# Patient Record
Sex: MALE | Race: WHITE | Employment: OTHER | URBAN - METROPOLITAN AREA
[De-identification: names, ages, dates, MRNs, and addresses within clinical notes are randomized per-mention and may not be internally consistent; named-entity substitution may affect disease eponyms.]

---

## 2020-06-19 NOTE — PROGRESS NOTES
[facility]  [phone]        Pre-Op Phone Call:     Patient Name: Rhoda Katz     No relevant phone numbers on file. Home phone:  [phone]    Surgery Time:   29-75-24-36 Time:  6927     Left extended Message:  NA     Message left with:     Recent change in health status:  NA     Advised of transportation/ policy:  Follow MD instructions      NPO policy reviewed:  NA     Advised to take morning heart/blood pressure medications with sips of water morning of surgery? Yes     Instructed to bring eye drops, photo identification, and insurance card day of surgery? Yes     Advised to wear short sleeved button down shirt (no T-shirt underneath):  NA     Advised not to wear jewelry, hairpins, or pantyhose day of surgery? NA     Advised not to wear make-up and to wash face day of surgery?   NA    Remarks:        Electronically signed by:  Faustina Chakraborty RN at 6/19/2020 3:55 PM

## 2020-06-22 ENCOUNTER — HOSPITAL ENCOUNTER (OUTPATIENT)
Dept: SURGERY | Age: 85
Setting detail: OUTPATIENT SURGERY
Discharge: HOME | End: 2020-06-22
Payer: MEDICARE

## 2020-06-22 VITALS — SYSTOLIC BLOOD PRESSURE: 150 MMHG | DIASTOLIC BLOOD PRESSURE: 79 MMHG

## 2020-06-22 PROCEDURE — 66821 AFTER CATARACT LASER SURGERY: CPT

## 2020-06-22 PROCEDURE — 6370000000 HC RX 637 (ALT 250 FOR IP): Performed by: OPHTHALMOLOGY

## 2020-06-22 RX ORDER — FUROSEMIDE 20 MG/1
TABLET ORAL
COMMUNITY
Start: 2020-06-01

## 2020-06-22 RX ORDER — LOVASTATIN 40 MG/1
TABLET ORAL
COMMUNITY
Start: 2020-04-30

## 2020-06-22 RX ORDER — AMLODIPINE BESYLATE 5 MG/1
TABLET ORAL
COMMUNITY
Start: 2020-03-31

## 2020-06-22 RX ORDER — PHENYLEPHRINE HCL 2.5 %
1 DROPS OPHTHALMIC (EYE) ONCE
Status: COMPLETED | OUTPATIENT
Start: 2020-06-22 | End: 2020-06-22

## 2020-06-22 RX ORDER — SODIUM CHLORIDE 0.9 % (FLUSH) 0.9 %
10 SYRINGE (ML) INJECTION EVERY 12 HOURS SCHEDULED
Status: DISCONTINUED | OUTPATIENT
Start: 2020-06-22 | End: 2020-06-23 | Stop reason: HOSPADM

## 2020-06-22 RX ORDER — LEVOTHYROXINE SODIUM 0.05 MG/1
TABLET ORAL
COMMUNITY
Start: 2020-04-30

## 2020-06-22 RX ORDER — TROPICAMIDE 10 MG/ML
1 SOLUTION/ DROPS OPHTHALMIC ONCE
Status: COMPLETED | OUTPATIENT
Start: 2020-06-22 | End: 2020-06-22

## 2020-06-22 RX ORDER — LISINOPRIL 40 MG/1
TABLET ORAL
COMMUNITY
Start: 2020-04-04

## 2020-06-22 RX ORDER — SODIUM CHLORIDE 0.9 % (FLUSH) 0.9 %
10 SYRINGE (ML) INJECTION PRN
Status: DISCONTINUED | OUTPATIENT
Start: 2020-06-22 | End: 2020-06-23 | Stop reason: HOSPADM

## 2020-06-22 RX ADMIN — TROPICAMIDE 1 DROP: 10 SOLUTION/ DROPS OPHTHALMIC at 11:05

## 2020-06-22 RX ADMIN — PHENYLEPHRINE HYDROCHLORIDE 1 DROP: 25 SOLUTION/ DROPS OPHTHALMIC at 11:05

## 2020-06-22 NOTE — OP NOTE
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 Post-operative diagnosis:  Same    Procedure Performed:  YAG Capsulotomy of the left eye    Anesthesia:  None    Estimated Blood Loss: None    Specimens removed: None    Complications:  None    Description of Procedure: A Yag Capsulotomy was performed today on the left eye. Pt appears to be oriented to time, place, and person. Pre-op medications instilled in the left eye: Proparacaine 1 drop, Sam-Synephrine 2.5% 1 drop topical and Mydriacyl 0.5% 1 drop topical. Patient is comfortable and will be released to self.      Electronically signed by: Gonzalo Lund MD,6/22/2020,11:54 AM

## 2022-10-02 PROCEDURE — 99283 EMERGENCY DEPT VISIT LOW MDM: CPT

## 2022-10-03 ENCOUNTER — HOSPITAL ENCOUNTER (EMERGENCY)
Age: 87
Discharge: HOME | End: 2022-10-03
Attending: EMERGENCY MEDICINE
Payer: MEDICARE

## 2022-10-03 VITALS
WEIGHT: 213.19 LBS | OXYGEN SATURATION: 97 % | RESPIRATION RATE: 18 BRPM | SYSTOLIC BLOOD PRESSURE: 168 MMHG | DIASTOLIC BLOOD PRESSURE: 93 MMHG | HEART RATE: 90 BPM | TEMPERATURE: 98.1 F

## 2022-10-03 DIAGNOSIS — H60.501 ACUTE OTITIS EXTERNA OF RIGHT EAR, UNSPECIFIED TYPE: Primary | ICD-10-CM

## 2022-10-03 DIAGNOSIS — R21 RASH AND OTHER NONSPECIFIC SKIN ERUPTION: ICD-10-CM

## 2022-10-03 LAB
GLUCOSE BLD-MCNC: 127 MG/DL (ref 70–99)
PERFORMED ON: ABNORMAL

## 2022-10-03 RX ORDER — TRAMADOL HYDROCHLORIDE 50 MG/1
50 TABLET ORAL EVERY 6 HOURS PRN
Qty: 3 TABLET | Refills: 0 | Status: SHIPPED | OUTPATIENT
Start: 2022-10-03 | End: 2022-10-04

## 2022-10-03 RX ORDER — AZITHROMYCIN 250 MG/1
TABLET, FILM COATED ORAL
Qty: 6 TABLET | Refills: 0 | Status: SHIPPED | OUTPATIENT
Start: 2022-10-03 | End: 2022-10-13

## 2022-10-03 RX ORDER — NEOMYCIN SULFATE, POLYMYXIN B SULFATE AND HYDROCORTISONE 10; 3.5; 1 MG/ML; MG/ML; [USP'U]/ML
3 SUSPENSION/ DROPS AURICULAR (OTIC) 4 TIMES DAILY
Qty: 10 ML | Refills: 0 | Status: SHIPPED | OUTPATIENT
Start: 2022-10-03 | End: 2022-10-13

## 2022-10-03 ASSESSMENT — PAIN SCALES - GENERAL: PAINLEVEL_OUTOF10: 8

## 2022-10-03 ASSESSMENT — PAIN - FUNCTIONAL ASSESSMENT: PAIN_FUNCTIONAL_ASSESSMENT: 0-10

## 2022-10-03 NOTE — ED NOTES
D/C: Order noted for d/c. Pt confirmed d/c paperwork  have correct name. Discharge and education instructions reviewed with patient. Teach-back successful. Pt verbalized understanding and signed d/c papers. Pt denied questions at this time. No acute distress noted. Patient instructed to follow-up as noted - return to emergency department if symptoms worsen. Patient verbalized understanding. Discharged per EDMD with discharge instructions. Pt discharged to private vehicle. Patient stable upon departure. Thanked patient for choosing [facility]) for care.           Lynette Cook RN  10/03/22 4263

## 2022-10-03 NOTE — DISCHARGE INSTRUCTIONS
Take prescribed medication as prescribed only  Follow-up with ENT Dr. Carlin Sicard. Give his office a call in the morning to be seen. Follow-up to primary care physician as needed  Return emergency room for any worsening. Do not take anymore the amoxicillin. Start taking the Zithromax.

## 2022-10-03 NOTE — ED PROVIDER NOTES
Date of evaluation: 10/2/2022    ED Attending Attestation Note     CHIEF COMPLAINT     Pain   HISTORY OF PRESENT ILLNESS  (Location/Symptom, Timing/Onset,Context/Setting, Quality, Duration, Modifying Factors, Severity). Note limiting factors. This patient was seen by the advance practice provider. I have seen and examined the patient, agree with the workup, evaluation, management and diagnosis. The care plan has been discussed. Chief Complaint   Patient presents with    Rash     Diagnosed last Wednesday for inner ear infection. Pt noticed rash tonight. Paul Wise is a 80 y.o. male who presents to the emergency department secondary to concern for worsening ear pain on the right. He also has a rash noted to the face however he states this is secondary. The rash is not painful, itchy, he did not know about it until his daughter visited him in the evening time. He states his main concern is that the pain is not letting him sleep. He was taking the hydrocodone prescribed to him by [facility] without any improvement but he did finish the medication 2 days ago. He also tells me he is worried diabetes runs in his family and maybe he is having some diabetes issues now. Denies any increased thirst, does endorse some increased urination. No dysuria. No Past medical history noted below: He does report a history of alcohol abuse for 25 years to me.   has no past medical history on file. Social History     Socioeconomic History    Marital status:      Spouse name: None    Number of children: None    Years of education: None    Highest education level: None   Tobacco Use    Smoking status: Never    Smokeless tobacco: Never   Vaping Use    Vaping Use: Never used   Substance and Sexual Activity    Alcohol use: Not Currently    Drug use: Never    Sexual activity: Not Currently     Aside from what is stated above denies any other symptoms or modifying factors.     Nursing Notes reviewed. History reviewed. No pertinent surgical history. History reviewed. No pertinent family history. CURRENT MEDICATIONS       Previous Medications    AMLODIPINE (NORVASC) 5 MG TABLET        FUROSEMIDE (LASIX) 20 MG TABLET        LEVOTHYROXINE (SYNTHROID) 50 MCG TABLET        LISINOPRIL (PRINIVIL;ZESTRIL) 40 MG TABLET        LOVASTATIN (MEVACOR) 40 MG TABLET        METFORMIN HCL PO    Take 500 mcg by mouth once    VITAMIN D 25 MCG (1000 UT) CAPS    Take 1 capsule by mouth daily      DIAGNOSTIC RESULTS   RADIOLOGY:   Interpretation per Radiologist below, if available at the time of this note:  No orders to display     Patient was given the following medications:  Orders Placed This Encounter   Medications    neomycin-polymyxin-hydrocortisone (CORTISPORIN) 3.5-63674-0 otic suspension     Sig: Place 3 drops into the right ear 4 times daily for 10 days     Dispense:  10 mL     Refill:  0    traMADol (ULTRAM) 50 MG tablet     Sig: Take 1 tablet by mouth every 6 hours as needed for Pain (MAY TAKE 2 TABS EVERY 6 HOURS FOR SEVERE PAIN) for up to 1 day. Dispense:  3 tablet     Refill:  0    azithromycin (ZITHROMAX) 250 MG tablet     Si TABS DAY 1 THEN 1 TAB DAYS 2-5     Dispense:  6 tablet     Refill:  0       INITIAL VITALS: BP: (!) 168/93, Temp: 98.1 °F (36.7 °C), Heart Rate: 90, Resp: 18, SpO2: 97 %     I personally saw the patient and performed a substantive portion of the visit including all aspects of the medical decision making. Is this patient to be included in the SEP-1 Core Measure due to severe sepsis or septic shock? No   Exclusion criteria - the patient is NOT to be included for SEP-1 Core Measure due to:  2+ SIRS criteria are not met      My assessment reveals an elderly male who is sitting up in bed. Does not appear to be in any acute medical arrest or distress. Answers questions appropriately and in complete sentences.   He has a rash as seen in the imaging does not appear consistent with a rash from amoxicillin which is something he is concerned about. It does not appear consistent with shingles. He does appear to have a component of otitis externa in addition to the otitis media. As the amoxicillin has not been working for him he was changed to azithromycin and also started on eardrops. He did have a fingerstick done here that was within normal limits. I had a really long discussion with him and his daughter regarding stronger pain medications given the patient lives alone and is 80years old. While he feels very comfortable taking medications due to his prior alcohol history I told him this makes me more nervous about prescribing him anything stronger. We did give him a referral to ENT and he was prescribed 3 tablets of tramadol to go home with. We discussed return precautions and reiterated importance of following up with ENT. He and his daughter both expressed understanding of instructions, were in agreement with plan, and he was discharged home in stable condition. FINAL IMPRESSION      1. Acute otitis externa of right ear, unspecified type    2. Rash and other nonspecific skin eruption        DISPOSITION/PLAN   DISPOSITION Decision To Discharge 10/03/2022 02:50:56 AM      PATIENT REFERRED TO:  Zachariah Osei MD  [address] [facility]  [address] [facility]  [phone]    Call   As needed    [address], [place] [address]  [phone]    Call in 1 day        DISCHARGE MEDICATIONS:  New Prescriptions    AZITHROMYCIN (ZITHROMAX) 250 MG TABLET    2 TABS DAY 1 THEN 1 TAB DAYS 2-5    NEOMYCIN-POLYMYXIN-HYDROCORTISONE (CORTISPORIN) 3.5-07001-0 OTIC SUSPENSION    Place 3 drops into the right ear 4 times daily for 10 days    TRAMADOL (ULTRAM) 50 MG TABLET    Take 1 tablet by mouth every 6 hours as needed for Pain (MAY TAKE 2 TABS EVERY 6 HOURS FOR SEVERE PAIN) for up to 1 day.             (Please note that portions of this note were completed with a voice recognition program. Efforts were made to edit the dictations but occasionally words are mis-transcribed.)    Josleyn Craft MD (electronically signed)  Attending Emergency Physician        Joselyn Craft MD  10/03/22 6027

## 2022-10-03 NOTE — ED TRIAGE NOTES
Pt presents to ED with c/o rash that developed today on the right side of his face including his ear. R ear noted to be red and swollen. Pt states he was diagnosed last Wednesday with an inner ear infection in which he was prescribed hydrocodone and amoxicillin. Pt denies ringing of the ears. Pt rates ear pain a 8/10.

## 2022-10-03 NOTE — ED PROVIDER NOTES
**ADVANCED PRACTICE PROVIDER, I HAVE EVALUATED THIS PATIENT**        [address]      Pt Name: Kasandra Stringer  CEJ:[id number]  [place] 1934  Date of evaluation: 10/2/2022  Provider: Dalia Martins PA-C    This patient was seen and evaluated by attending physician Dr. Shahrzad Brown MD      Chief Complaint:    Chief Complaint   Patient presents with    Rash     Diagnosed last Wednesday for inner ear infection. Pt noticed rash tonight. Nursing Notes, Past Medical Hx, Past Surgical Hx, Social Hx, Allergies, and Family Hx were all reviewed and agreed with or any disagreements were addressed in the HPI.    HPI: (Location, Duration, Timing, Severity, Quality, Assoc Sx, Context, Modifying factors)    Chief Complaint of complaint of right ear pain. Also complaint of rash on his face that started appearing 2 days after taking antibiotic he was given for ear infection. He was given amoxicillin that he take twice a day. And he is on his third day of antibiotic. Denies fever, still complain of pain to the right ear. [place] Reeks is gotten worse without swelling. Denies any drainage. No difficulty with hearing. No other complaint. This is a  80 y.o. male who presents to emergency room with the above complaint. PastMedical/Surgical History:  History reviewed. No pertinent past medical history. History reviewed. No pertinent surgical history.     Medications:  Discharge Medication List as of 10/3/2022  3:04 AM        CONTINUE these medications which have NOT CHANGED    Details   amLODIPine (NORVASC) 5 MG tablet Historical Med      lisinopril (PRINIVIL;ZESTRIL) 40 MG tablet Historical Med      levothyroxine (SYNTHROID) 50 MCG tablet Historical Med      lovastatin (MEVACOR) 40 MG tablet Historical Med      METFORMIN HCL PO Take 500 mcg by mouth onceHistorical Med      furosemide (LASIX) 20 MG tablet Historical Med      vitamin D 25 MCG (1000 UT) CAPS Take 1 capsule by mouth dailyHistorical Med               Review of Systems:  (2-9 systems needed)  Review of Systems   Constitutional:  Negative for chills and fever. HENT:  Positive for ear pain. Negative for congestion, ear discharge and sore throat. Eyes:  Negative for pain and visual disturbance. Respiratory:  Negative for cough and shortness of breath. Cardiovascular:  Negative for chest pain and leg swelling. Gastrointestinal:  Negative for abdominal pain, nausea and vomiting. Genitourinary:  Negative for dysuria and frequency. Musculoskeletal:  Negative for back pain and neck pain. Skin:  Positive for rash. Negative for wound. Neurological:  Negative for dizziness and light-headedness. \"Positives and Pertinent negatives as per HPI\"    Physical Exam:  Physical Exam  Vitals and nursing note reviewed. Constitutional:       Appearance: He is well-developed. He is not diaphoretic. HENT:      Head: Normocephalic and atraumatic. Right Ear: Hearing normal. No decreased hearing noted. Swelling and tenderness present. No drainage. There is no impacted cerumen. No foreign body. No mastoid tenderness. Left Ear: Hearing, tympanic membrane, ear canal and external ear normal.      Nose: Nose normal.   Eyes:      General:         Right eye: No discharge. Left eye: No discharge. Cardiovascular:      Rate and Rhythm: Normal rate and regular rhythm. Heart sounds: Normal heart sounds. No murmur heard. No friction rub. No gallop. Pulmonary:      Effort: Pulmonary effort is normal. No respiratory distress. Breath sounds: Normal breath sounds. No wheezing or rales. Chest:      Chest wall: No tenderness. Musculoskeletal:         General: Normal range of motion. Cervical back: Normal range of motion and neck supple. Skin:     General: Skin is warm and dry. Findings: Erythema and rash present. Rash is macular.    Neurological:      Mental Status: He is alert and oriented to person, place, and time. Psychiatric:         Behavior: Behavior normal.       MEDICAL DECISION MAKING    Vitals:    Vitals:    10/03/22 0050   BP: (!) 168/93   Pulse: 90   Resp: 18   Temp: 98.1 °F (36.7 °C)   TempSrc: Oral   SpO2: 97%   Weight: 213 lb 3 oz (96.7 kg)       LABS:  Labs Reviewed   POCT GLUCOSE - Abnormal; Notable for the following components:       Result Value    POC Glucose 127 (*)     All other components within normal limits   POCT GLUCOSE        Remainder of labs reviewed and were negative at this time or not returned at the time of this note. RADIOLOGY:   Non-plain film images such as CT, Ultrasound and MRI are read by the radiologist. Jaci Okeefe PA-C have directly visualized the radiologic plain film image(s) with the below findings:      Interpretation per the Radiologist below, if available at the time of this note:    No orders to display        No results found. MEDICAL DECISION MAKING / ED COURSE:      PROCEDURES:   Procedures    None    Patient was given:  Medications - No data to display    Emergency room course: Patient on exam throat is clear. Nonerythematous no exudate noted. Uvula midline without swelling. Left ear is clear. TM is visible nonbulging nonerythematous. No pinna mastoid or tragus tenderness. .  The right ear showed no mastoid tenderness. Does show tragus and pinna tenderness slight swelling noted of the pinna. No erythema. Sensitive to the speculum there is noticeable swelling in the ear canal.  Unable to visualize the TM due to the swelling and pain that it causes to insert the speculum. No drainage. Cardiovascular regular rhythm, lungs are clear. No wheeze rales or rhonchi noted. Patient does have a macular erythematous rash noted on his face more on the right side and on his cheek. Unsure what is caused this does not look like allergy to penicillin.     Did have my attending Dr. Trudi Moore see this patient with me. She agrees that it does not look like a penicillin allergy. Unsure what caused the rash. See photograph below. At this time I did discuss with patient treatment plan for his ear. I will put him on Cortisporin otic. I did inform him since he only have a few days of antibiotic left continue that. I will give him Dr. Vazquez Munoz ENT to follow-up with. His daughter and the patient was okay with this. I will put him on a few tramadol for pain. The patient tolerated their visit well. I evaluated the patient. The physician was available for consultation as needed. The patient and / or the family were informed of the results of any tests, a time was given to answer questions, a plan was proposed and they agreed with plan. I am the Primary Clinician of Record. CLINICAL IMPRESSION:  1. Acute otitis externa of right ear, unspecified type    2.  Rash and other nonspecific skin eruption        DISPOSITION Decision To Discharge 10/03/2022 02:50:56 AM      PATIENT REFERRED TO:  Yaw Marlow MD  [address] [place] [address]  [phone]    Call   As needed    [address]  [address] [facility]  [phone]    Call in 1 day      DISCHARGE MEDICATIONS:  Discharge Medication List as of 10/3/2022  3:04 AM        START taking these medications    Details   neomycin-polymyxin-hydrocortisone (CORTISPORIN) 3.5-25515-4 otic suspension Place 3 drops into the right ear 4 times daily for 10 days, Disp-10 mL, R-0Print      traMADol (ULTRAM) 50 MG tablet Take 1 tablet by mouth every 6 hours as needed for Pain (MAY TAKE 2 TABS EVERY 6 HOURS FOR SEVERE PAIN) for up to 1 day., Disp-3 tablet, R-0Print      azithromycin (ZITHROMAX) 250 MG tablet 2 TABS DAY 1 THEN 1 TAB DAYS 2-5, Disp-6 tablet, R-0Print             DISCONTINUED MEDICATIONS:  Discharge Medication List as of 10/3/2022  3:04 AM                 (Please note the MDM and HPI sections of this note were completed with a voice recognition program.  Efforts were made to edit the dictations but occasionally words are mis-transcribed.)    Electronically signed, Marisa Roman PA-C,          Marisa Roman PA-C  10/06/22 8712

## 2022-10-05 ENCOUNTER — TELEPHONE (OUTPATIENT)
Dept: ENT CLINIC | Age: 87
End: 2022-10-05

## 2022-10-05 ENCOUNTER — OFFICE VISIT (OUTPATIENT)
Dept: ENT CLINIC | Age: 87
End: 2022-10-05
Payer: MEDICARE

## 2022-10-05 VITALS
HEART RATE: 96 BPM | HEIGHT: 74 IN | DIASTOLIC BLOOD PRESSURE: 99 MMHG | SYSTOLIC BLOOD PRESSURE: 196 MMHG | WEIGHT: 210 LBS | BODY MASS INDEX: 26.95 KG/M2

## 2022-10-05 DIAGNOSIS — H92.01 RIGHT EAR PAIN: Primary | ICD-10-CM

## 2022-10-05 DIAGNOSIS — B02.9 HERPES ZOSTER WITHOUT COMPLICATION: ICD-10-CM

## 2022-10-05 PROCEDURE — 99204 OFFICE O/P NEW MOD 45 MIN: CPT | Performed by: OTOLARYNGOLOGY

## 2022-10-05 PROCEDURE — 1036F TOBACCO NON-USER: CPT | Performed by: OTOLARYNGOLOGY

## 2022-10-05 PROCEDURE — G8484 FLU IMMUNIZE NO ADMIN: HCPCS | Performed by: OTOLARYNGOLOGY

## 2022-10-05 PROCEDURE — G8427 DOCREV CUR MEDS BY ELIG CLIN: HCPCS | Performed by: OTOLARYNGOLOGY

## 2022-10-05 PROCEDURE — 1123F ACP DISCUSS/DSCN MKR DOCD: CPT | Performed by: OTOLARYNGOLOGY

## 2022-10-05 PROCEDURE — G8419 CALC BMI OUT NRM PARAM NOF/U: HCPCS | Performed by: OTOLARYNGOLOGY

## 2022-10-05 NOTE — PROGRESS NOTES
[facility]      Patient Name: Elyssa Guerra  Medical Record Number:  [id number]  Primary Care Physician:  Heather Hays MD  Date of Consultation: 10/5/2022    Chief Complaint: Right otalgia      HISTORY OF PRESENT ILLNESS  Hamida Chung is a(n) 80 y.o. male who presents for evaluation of right-sided otalgia. The patient says that about a week and half ago he started having severe right-sided ear pain. He went to the emergency room outside facility and it sounds as though he had fairly normal exam findings. He said he had an in onset of rash and swelling of the right ear as well as the face. He was then diagnosed with a rash and otitis externa. He was started on eardrops and azithromycin. He said it is gotten a little bit better, but he still has pain. He said that he did have a bit of muffled hearing that has resolved. No changes in vision. REVIEW OF SYSTEMS  As above    PHYSICAL EXAM  GENERAL: No Acute Distress, Alert and Oriented, no Hoarseness, strong voice  EYES: EOMI, Anti-icteric  HENT:   Head: Normocephalic and atraumatic. Face: Appear to have some healing vesicular eruptions of the V3 distribution  Right Ear: Patient has vesicular eruptions of the loly. The ear canal itself looks okay. Left Ear: Normal external ear, normal external auditory canal, intact tympanic membrane with normal mobility and aerated middle ear  Mouth/Oral Cavity:  normal lips, Uvula is midline, no mucosal lesions, no trismus  Oropharynx/Larynx:  normal oropharynx  Nose:Normal external nasal appearance. NECK: Normal range of motion, no thyromegaly, trachea is midline, no lymphadenopathy, no neck masses, no crepitus            ASSESSMENT/PLAN  1. Right ear pain  Patient's exam and history is consistent with a herpes zoster outbreak in the V3 distribution. It is getting better at this point.   I think its been too long to benefit from oral antivirals. I see no evidence this is affecting his hearing or vision. I explained to him that sometimes the pain itself will take a while to completely resolve. If it persists we may have to have him see his primary care doctor to consider something like Neurontin. 2. Herpes zoster without complication  He still has some vesicular eruption of the loly. I will give him Bactroban ointment just to prevent a secondary infection             I have performed a head and neck physical exam personally or was physically present during the key or critical portions of the service. This note was generated completely or in part utilizing Dragon dictation speech recognition software. Occasionally, words are mistranscribed and despite editing, the text may contain inaccuracies due to incorrect word recognition. If further clarification is needed please contact the office at [phone].

## 2022-10-05 NOTE — TELEPHONE ENCOUNTER
Prescription was sent to the wrong pharmacy:    Please send to [facility] on [address] and [address]  phone [phone]

## 2022-10-06 ASSESSMENT — ENCOUNTER SYMPTOMS
SHORTNESS OF BREATH: 0
VOMITING: 0
COUGH: 0
NAUSEA: 0
EYE PAIN: 0
ABDOMINAL PAIN: 0
SORE THROAT: 0
BACK PAIN: 0